# Patient Record
Sex: MALE | Race: BLACK OR AFRICAN AMERICAN | Employment: UNEMPLOYED | URBAN - METROPOLITAN AREA
[De-identification: names, ages, dates, MRNs, and addresses within clinical notes are randomized per-mention and may not be internally consistent; named-entity substitution may affect disease eponyms.]

---
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
## 2022-10-14 ENCOUNTER — ANESTHESIA EVENT (OUTPATIENT)
Dept: SURGERY | Age: 22
End: 2022-10-14
Payer: COMMERCIAL

## 2022-10-17 ENCOUNTER — ANESTHESIA (OUTPATIENT)
Dept: SURGERY | Age: 22
End: 2022-10-17
Payer: COMMERCIAL

## 2022-10-17 ENCOUNTER — HOSPITAL ENCOUNTER (OUTPATIENT)
Age: 22
Setting detail: OUTPATIENT SURGERY
Discharge: HOME | End: 2022-10-17
Attending: PODIATRIST | Admitting: PODIATRIST
Payer: COMMERCIAL

## 2022-10-17 VITALS
RESPIRATION RATE: 17 BRPM | HEART RATE: 75 BPM | WEIGHT: 210 LBS | OXYGEN SATURATION: 99 % | HEIGHT: 71 IN | BODY MASS INDEX: 29.4 KG/M2 | SYSTOLIC BLOOD PRESSURE: 135 MMHG | TEMPERATURE: 97.6 F | DIASTOLIC BLOOD PRESSURE: 80 MMHG

## 2022-10-17 DIAGNOSIS — B07.0 PLANTAR WART, LEFT FOOT: Primary | ICD-10-CM

## 2022-10-17 PROCEDURE — 76060000031 HC ANESTHESIA FIRST 0.5 HR: Performed by: PODIATRIST

## 2022-10-17 PROCEDURE — 76210000020 HC REC RM PH II FIRST 0.5 HR: Performed by: PODIATRIST

## 2022-10-17 PROCEDURE — 74011000250 HC RX REV CODE- 250: Performed by: PODIATRIST

## 2022-10-17 PROCEDURE — 76210000063 HC OR PH I REC FIRST 0.5 HR: Performed by: PODIATRIST

## 2022-10-17 PROCEDURE — 88304 TISSUE EXAM BY PATHOLOGIST: CPT

## 2022-10-17 PROCEDURE — 2709999900 HC NON-CHARGEABLE SUPPLY: Performed by: PODIATRIST

## 2022-10-17 PROCEDURE — 74011250636 HC RX REV CODE- 250/636: Performed by: STUDENT IN AN ORGANIZED HEALTH CARE EDUCATION/TRAINING PROGRAM

## 2022-10-17 PROCEDURE — 76010000154 HC OR TIME FIRST 0.5 HR: Performed by: PODIATRIST

## 2022-10-17 RX ORDER — MIDAZOLAM HYDROCHLORIDE 1 MG/ML
INJECTION, SOLUTION INTRAMUSCULAR; INTRAVENOUS AS NEEDED
Status: DISCONTINUED | OUTPATIENT
Start: 2022-10-17 | End: 2022-10-18 | Stop reason: HOSPADM

## 2022-10-17 RX ORDER — HYDROCODONE BITARTRATE AND ACETAMINOPHEN 5; 325 MG/1; MG/1
1 TABLET ORAL
Qty: 20 TABLET | Refills: 0 | Status: SHIPPED | OUTPATIENT
Start: 2022-10-17 | End: 2022-10-22

## 2022-10-17 RX ORDER — SODIUM CHLORIDE, SODIUM LACTATE, POTASSIUM CHLORIDE, CALCIUM CHLORIDE 600; 310; 30; 20 MG/100ML; MG/100ML; MG/100ML; MG/100ML
50 INJECTION, SOLUTION INTRAVENOUS CONTINUOUS
Status: DISCONTINUED | OUTPATIENT
Start: 2022-10-17 | End: 2022-10-17 | Stop reason: HOSPADM

## 2022-10-17 RX ORDER — SULFAMETHOXAZOLE AND TRIMETHOPRIM 800; 160 MG/1; MG/1
1 TABLET ORAL 2 TIMES DAILY
Qty: 20 TABLET | Refills: 0 | Status: SHIPPED | OUTPATIENT
Start: 2022-10-17

## 2022-10-17 RX ORDER — SODIUM CHLORIDE 0.9 % (FLUSH) 0.9 %
5-40 SYRINGE (ML) INJECTION EVERY 8 HOURS
Status: DISCONTINUED | OUTPATIENT
Start: 2022-10-17 | End: 2022-10-17 | Stop reason: HOSPADM

## 2022-10-17 RX ORDER — SODIUM CHLORIDE 0.9 % (FLUSH) 0.9 %
5-40 SYRINGE (ML) INJECTION AS NEEDED
Status: DISCONTINUED | OUTPATIENT
Start: 2022-10-17 | End: 2022-10-17 | Stop reason: HOSPADM

## 2022-10-17 RX ORDER — PROPOFOL 10 MG/ML
INJECTION, EMULSION INTRAVENOUS AS NEEDED
Status: DISCONTINUED | OUTPATIENT
Start: 2022-10-17 | End: 2022-10-18 | Stop reason: HOSPADM

## 2022-10-17 RX ORDER — SODIUM CHLORIDE 9 MG/ML
50 INJECTION, SOLUTION INTRAVENOUS CONTINUOUS
Status: DISCONTINUED | OUTPATIENT
Start: 2022-10-17 | End: 2022-10-17 | Stop reason: HOSPADM

## 2022-10-17 RX ORDER — BUPIVACAINE HYDROCHLORIDE AND EPINEPHRINE 2.5; 5 MG/ML; UG/ML
INJECTION, SOLUTION EPIDURAL; INFILTRATION; INTRACAUDAL; PERINEURAL AS NEEDED
Status: DISCONTINUED | OUTPATIENT
Start: 2022-10-17 | End: 2022-10-17 | Stop reason: HOSPADM

## 2022-10-17 RX ORDER — BACITRACIN 500 [USP'U]/G
OINTMENT TOPICAL AS NEEDED
Status: DISCONTINUED | OUTPATIENT
Start: 2022-10-17 | End: 2022-10-17 | Stop reason: HOSPADM

## 2022-10-17 RX ADMIN — PROPOFOL 125 MG: 10 INJECTION, EMULSION INTRAVENOUS at 12:25

## 2022-10-17 RX ADMIN — MIDAZOLAM HYDROCHLORIDE 2 MG: 2 INJECTION, SOLUTION INTRAMUSCULAR; INTRAVENOUS at 12:20

## 2022-10-17 NOTE — ANESTHESIA PREPROCEDURE EVALUATION
Relevant Problems   No relevant active problems       Anesthetic History   No history of anesthetic complications            Review of Systems / Medical History  Patient summary reviewed, nursing notes reviewed and pertinent labs reviewed    Pulmonary            Asthma : well controlled  Pertinent negatives: No recent URI     Neuro/Psych   Within defined limits           Cardiovascular  Within defined limits                Exercise tolerance: >4 METS     GI/Hepatic/Renal  Within defined limits              Endo/Other  Within defined limits           Other Findings              Physical Exam    Airway  Mallampati: II  TM Distance: 4 - 6 cm  Neck ROM: normal range of motion   Mouth opening: Normal     Cardiovascular  Regular rate and rhythm,  S1 and S2 normal,  no murmur, click, rub, or gallop             Dental    Dentition: Lower dentition intact and Upper dentition intact     Pulmonary  Breath sounds clear to auscultation               Abdominal         Other Findings            Anesthetic Plan    ASA: 2  Anesthesia type: MAC          Induction: Intravenous  Anesthetic plan and risks discussed with: Patient

## 2022-10-17 NOTE — BRIEF OP NOTE
Brief Postoperative Note    Patient: Helen Sandoval  YOB: 2000  MRN: [id number]    Date of Procedure: 10/17/2022     Pre-Op Diagnosis: WART LEFT FOOT    Post-Op Diagnosis: Same as preoperative diagnosis.       Procedure(s):  DESTRUCTION OF WART LEFT FOOT    Surgeon(s):  Richlele Francois DPM    Surgical Assistant: None    Anesthesia: MAC     Estimated Blood Loss (mL): Minimal    Complications: None    Specimens:   ID Type Source Tests Collected by Time Destination   1 : Wart Left Foot Preservative Foot, left  [facility] 10/17/2022 1233 Pathology        Implants: * No implants in log *    Drains: * No LDAs found *    Findings: wart  Electronically Signed by Claritza Gomez DPM on 10/17/2022 at 12:47 PM

## 2022-10-17 NOTE — DISCHARGE INSTRUCTIONS
DISCHARGE SUMMARY from Nurse    PATIENT INSTRUCTIONS:    After general anesthesia or intravenous sedation, for 24 hours or while taking prescription Narcotics:  Limit your activities  Do not drive and operate hazardous machinery  Do not make important personal or business decisions  Do  not drink alcoholic beverages  If you have not urinated within 8 hours after discharge, please contact your surgeon on call. Report the following to your surgeon:  Excessive pain, swelling, redness or odor of or around the surgical area  Temperature over 100.5  Nausea and vomiting lasting longer than 4 hours or if unable to take medications  Any signs of decreased circulation or nerve impairment to extremity: change in color, persistent  numbness, tingling, coldness or increase pain  Any questions      These are general instructions for a healthy lifestyle:    No smoking/ No tobacco products/ Avoid exposure to second hand smoke  Surgeon General's Warning:  Quitting smoking now greatly reduces serious risk to your health. Obesity, smoking, and sedentary lifestyle greatly increases your risk for illness    A healthy diet, regular physical exercise & weight monitoring are important for maintaining a healthy lifestyle    You may be retaining fluid if you have a history of heart failure or if you experience any of the following symptoms:  Weight gain of 3 pounds or more overnight or 5 pounds in a week, increased swelling in our hands or feet or shortness of breath while lying flat in bed. Please call your doctor as soon as you notice any of these symptoms; do not wait until your next office visit. The discharge information has been reviewed with the {PATIENT PARENT GUARDIAN:25027}. The {PATIENT PARENT GUARDIAN:36261} verbalized understanding.   Discharge medications reviewed with the {Dishcarge meds reviewed DMMI:63141} and appropriate educational materials and side effects teaching were provided.   ___________________________________________________________________________________________________________________________________

## 2022-10-17 NOTE — PERIOP NOTES
Spoke via telephone with patient's mother, Adriane Barry, who advised she will return to take responsibility for patient when discharged.

## 2022-10-17 NOTE — PERIOP NOTES
Handoff Report from Operating Room to PACU    Report received from Dr. Camille Vazquez and Jenny Magaña regarding Leighton Rosario. Surgeon(s):  Katie Hutchinson DPM  And Procedure(s) (LRB):  DESTRUCTION OF WART LEFT FOOT (Left)  confirmed   with allergies and dressings discussed. Anesthesia type, drugs, patient history, complications, estimated blood loss, vital signs, intake and output, and lines and temperature were reviewed.

## 2022-10-17 NOTE — OP NOTES
[facility]  OPERATIVE REPORT    Name:  Cole Mejia  MR#:  [id number]  :  2000  ACCOUNT #:  [de-identified]  DATE OF SERVICE:  10/17/2022    PREOPERATIVE DIAGNOSIS:  Wart, plantar aspect, left foot. POSTOPERATIVE DIAGNOSIS:  Wart, plantar aspect, left foot. PROCEDURE PERFORMED:  Destruction of wart, left foot. SURGEON:  Maritza Worthington DPM    ASSISTANT:none    ANESTHESIA:  IV sedation with local.    COMPLICATIONS:one    SPECIMENS REMOVED:  Soft tissue. IMPLANTS: none    ESTIMATED BLOOD LOSS:  Minimal.    INDICATIONS:  This 71-year-old black male presents with a painful lesion plantar left foot of more than 1 month. Conservative therapy has failed to alleviate the patient of symptoms and at this time, surgical intervention has been opted as the treatment of choice by the patient. Medical history and physical has been performed. The patient is released to surgery. Physical examination revealed palpable pedal pulses, fairly good muscle strength lower extremities bilateral.  Epicritic sensation intact. Plantar medial aspect distally, there is a circular painful wart-like lesion that is painful upon lateral compression and direct pressure. DESCRIPTION OF PROCEDURE:  The patient was brought into the operating room and placed on the operating table in the supine position. Under the influence of IV sedation, anesthesia was achieved using 0.05% Marcaine with epinephrine. Left foot was now prepped and draped in the usual sterile manner and a successful time-out was completed. Next, using a #15 blade and a curette, the wart was excised through the subcutaneous layer. The remaining wound was cauterized using electrocautery. Bacitracin followed by dry sterile dressings were placed on the left foot.   The patient appeared to have tolerated anesthesia and procedure well and was transferred from the operating room to Recovery with all vital signs stable and vascular status intact to all digits one through five, left foot. Preoperatively, he was given 1 g Ancef IV.       Desirae Michelle DPM      [id number]  D:  10/17/2022 13:59  T:  10/17/2022 16:47  JOB #:  [id number]

## 2022-10-17 NOTE — H&P
History and Physical    Subjective: John Gayle is a 25 y.o.  male who presents with a painful wart left foot. Onset of symptoms was gradual with gradually worsening course since that time. The pain is located plantar left foot. Patient describes the pain as continuous and rated as moderate. Pain has been associated with standing. Past Medical History:   Diagnosis Date    Asthma     in childhood      History reviewed. No pertinent surgical history. History reviewed. No pertinent family history. Social History     Tobacco Use    Smoking status: Every Day     Types: Cigars    Smokeless tobacco: Never    Tobacco comments:     Black and mild, one per day   Substance Use Topics    Alcohol use: Not Currently       Prior to Admission medications    Not on File     No Known Allergies     Review of Systems:  A comprehensive review of systems was negative except for that written in the History of Present Illness. Objective: Intake and Output:    No intake/output data recorded. No intake/output data recorded. Physical Exam:   Visit Vitals  /78 (BP 1 Location: Left upper arm, BP Patient Position: Reclining)   Pulse 70   Temp 97.7 °F (36.5 °C)   Resp 15   Ht 5' 11\" (1.803 m)   Wt 95.3 kg (210 lb)   SpO2 97%   BMI 29.29 kg/m²     General:  Alert, cooperative, no distress, appears stated age. Head:  Normocephalic, without obvious abnormality, atraumatic. Eyes:  Conjunctivae/corneas clear. PERRL   Ears:  Normal external ear canals both ears. Nose: Nares normal. Septum midline. Mucosa normal. No drainage or sinus tenderness. Throat: Lips, mucosa, and tongue normal. Teeth and gums normal.   Neck: Supple, symmetrical, trachea midline, no adenopathy, thyroid: no enlargement/tenderness/nodules,    Back:   Symmetric, no curvature. ROM normal.    Lungs:   Clear to auscultation bilaterally. Chest wall:  No tenderness or deformity.    Heart:  Regular rate and rhythm, S1, S2 normal, no murmur, click, rub or gallop. Abdomen:   Soft, non-tender. Bowel sounds normal. No masses,  No organomegaly. Genitalia:  Normal male without lesion, discharge or tenderness. Rectal:  Normal tone, normal prostate, no masses or tenderness  Guaiac negative stool. Extremities: Extremities normal, atraumatic, no cyanosis or edema. Pulses: 2+ and symmetric all extremities. Skin: Skin color, texture, turgor normal. No rashes or lesions   Lymph nodes: Cervical, supraclavicular, and axillary nodes normal.   Neurologic: CNII-XII intact. Normal strength, sensation and reflexes throughout. Lower extremities; Palpable pedal pulses with epicritic sensation intact   Good muscle strength   Painful circulare wart plantar medial left foot      Data Review:   No results found for this or any previous visit (from the past 24 hour(s)). Assessment:     Principal Problem:    Plantar wart, left foot (10/17/2022)        Plan:   Scheduled for destruction of wart left foot. Discussed risks, benefits, expected outcome as well as post op course.

## 2022-10-17 NOTE — PERIOP NOTES
Patient meets discharge criteria. Patient and mother [place] provided with verbal and written discharge instructions. Patient discharged by wheelchair with mother [place] to transport home.

## 2022-10-18 NOTE — ANESTHESIA POSTPROCEDURE EVALUATION
Procedure(s):  DESTRUCTION OF WART LEFT FOOT. MAC    Anesthesia Post Evaluation      Multimodal analgesia: multimodal analgesia used between 6 hours prior to anesthesia start to PACU discharge  Patient location during evaluation: PACU  Patient participation: complete - patient participated  Level of consciousness: awake and alert  Pain management: adequate  Airway patency: patent  Anesthetic complications: no  Cardiovascular status: acceptable  Respiratory status: acceptable  Hydration status: acceptable  Post anesthesia nausea and vomiting:  none  Final Post Anesthesia Temperature Assessment:  Normothermia (36.0-37.5 degrees C)      INITIAL Post-op Vital signs:   Vitals Value Taken Time   /78 10/17/22 1305   Temp 36.6 °C (97.8 °F) 10/17/22 1247   Pulse 81 10/17/22 1305   Resp 23 10/17/22 1305   SpO2 99 % 10/17/22 1305   Vitals shown include unvalidated device data.

## 2022-11-19 ENCOUNTER — HOSPITAL ENCOUNTER (EMERGENCY)
Age: 22
Discharge: HOME | End: 2022-11-19
Attending: EMERGENCY MEDICINE
Payer: COMMERCIAL

## 2022-11-19 ENCOUNTER — APPOINTMENT (OUTPATIENT)
Dept: CT IMAGING | Age: 22
End: 2022-11-19
Attending: EMERGENCY MEDICINE
Payer: COMMERCIAL

## 2022-11-19 VITALS
HEART RATE: 77 BPM | DIASTOLIC BLOOD PRESSURE: 77 MMHG | OXYGEN SATURATION: 100 % | SYSTOLIC BLOOD PRESSURE: 140 MMHG | WEIGHT: 250 LBS | BODY MASS INDEX: 33.86 KG/M2 | HEIGHT: 72 IN | RESPIRATION RATE: 18 BRPM | TEMPERATURE: 98 F

## 2022-11-19 DIAGNOSIS — S09.90XA CLOSED HEAD INJURY, INITIAL ENCOUNTER: Primary | ICD-10-CM

## 2022-11-19 DIAGNOSIS — S01.81XA CHIN LACERATION, INITIAL ENCOUNTER: ICD-10-CM

## 2022-11-19 DIAGNOSIS — F12.10 CANNABIS ABUSE: ICD-10-CM
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
## 2022-11-19 PROCEDURE — 70486 CT MAXILLOFACIAL W/O DYE: CPT

## 2022-11-19 PROCEDURE — 74011000250 HC RX REV CODE- 250: Performed by: EMERGENCY MEDICINE

## 2022-11-19 PROCEDURE — 70450 CT HEAD/BRAIN W/O DYE: CPT

## 2022-11-19 PROCEDURE — 74011250636 HC RX REV CODE- 250/636: Performed by: EMERGENCY MEDICINE

## 2022-11-19 PROCEDURE — 90715 TDAP VACCINE 7 YRS/> IM: CPT | Performed by: EMERGENCY MEDICINE

## 2022-11-19 PROCEDURE — 74011250637 HC RX REV CODE- 250/637: Performed by: EMERGENCY MEDICINE

## 2022-11-19 PROCEDURE — 90471 IMMUNIZATION ADMIN: CPT

## 2022-11-19 PROCEDURE — 99284 EMERGENCY DEPT VISIT MOD MDM: CPT

## 2022-11-19 PROCEDURE — 75810000293 HC SIMP/SUPERF WND  RPR

## 2022-11-19 RX ORDER — BACITRACIN 500 [USP'U]/G
OINTMENT TOPICAL 3 TIMES DAILY
Qty: 28 G | Refills: 0 | Status: SHIPPED | OUTPATIENT
Start: 2022-11-19 | End: 2022-11-30

## 2022-11-19 RX ORDER — LIDOCAINE HYDROCHLORIDE AND EPINEPHRINE 10; 10 MG/ML; UG/ML
1.5 INJECTION, SOLUTION INFILTRATION; PERINEURAL
Status: COMPLETED | OUTPATIENT
Start: 2022-11-19 | End: 2022-11-19

## 2022-11-19 RX ORDER — ACETAMINOPHEN 500 MG
1000 TABLET ORAL
Status: COMPLETED | OUTPATIENT
Start: 2022-11-19 | End: 2022-11-19

## 2022-11-19 RX ADMIN — Medication 15 MG: at 01:42

## 2022-11-19 RX ADMIN — ACETAMINOPHEN 1000 MG: 500 TABLET ORAL at 02:35

## 2022-11-19 RX ADMIN — TETANUS TOXOID, REDUCED DIPHTHERIA TOXOID AND ACELLULAR PERTUSSIS VACCINE, ADSORBED 0.5 ML: 5; 2.5; 8; 8; 2.5 SUSPENSION INTRAMUSCULAR at 02:35

## 2022-11-19 NOTE — ED NOTES
Emergency Department Nursing Plan of Care       The Nursing Plan of Care is developed from the Nursing assessment and Emergency Department Attending provider initial evaluation. The plan of care may be reviewed in the ED Provider note.     The Plan of Care was developed with the following considerations:   Patient / Family readiness to learn indicated by:verbalized understanding  Persons(s) to be included in education: patient  Barriers to Learning/Limitations:No    Signed     Alejandro Corral RN    11/19/2022   2:20 AM

## 2022-11-19 NOTE — ED PROVIDER NOTES
EMERGENCY DEPARTMENT HISTORY AND PHYSICAL EXAM               Please note that this dictation was completed with the assistance of \"Dragon\", the computer voice recognition software. Quite often unanticipated grammatical, syntax, homophones, and other interpretive errors are inadvertently transcribed by the computer software. Please disregard these errors and any errors that have escaped final proofreading. Thank you. Date: 11/19/22  Patient: Leighton Rosario  Patient Age and Sex: 25 y.o. male   MRN: [id number]  CSN: [id number]    History of Presenting Illness     Chief Complaint   Patient presents with    Head Injury    Laceration     chin     History Provided By: Patient/family/EMS (if applicable)    HPI: Leighton Rosario, 25 y.o. male with past medical history as documented below presents to the ED with c/o of fall with subsequent chin laceration. Patient reports that he feels dehydrated and has not been eating much throughout the day. He did smoke some marijuana tonight. Patient states that he felt dizzy and fell onto his chin. Denies any LOC or head injury. Patient reports unknown last tetanus. Denies any epistaxis, denies any visual complaints. He denies any preceding chest pain, shortness of breath or palpitations. Pt denies any other exacerbating or ameliorating factors. Pt specifically denies any recent fever, chills, nausea, vomiting, abdominal pain, CP, SOB, lightheadedness, dizziness, numbness, weakness, lower extremity swelling, heart palpitations, urinary sxs, diarrhea, constipation, melena, hematochezia, cough, or congestion. There are no other complaints, changes or physical findings pertinent to the HPI at this time.     PCP: None  Past History   Past Medical History:  Past Medical History:   Diagnosis Date    Asthma     in childhood       Past Surgical History:  Denies    Family History:   Family history reviewed and was non-contributory, unless specified below:    Social History:  Social History     Tobacco Use    Smoking status: Every Day     Types: Cigars    Smokeless tobacco: Never    Tobacco comments:     Black and mild, one per day   Vaping Use    Vaping Use: Never used   Substance Use Topics    Alcohol use: Not Currently    Drug use: Yes     Types: Marijuana       Allergies:  No Known Allergies    Current Medications:  No current facility-administered medications on file prior to encounter. Current Outpatient Medications on File Prior to Encounter   Medication Sig Dispense Refill    trimethoprim-sulfamethoxazole (BACTRIM DS, SEPTRA DS) 160-800 mg per tablet Take 1 Tablet by mouth two (2) times a day. 20 Tablet 0     Review of Systems   A complete ROS was reviewed by me today and all other systems negative, unless otherwise specified below:  Review of Systems   Constitutional: Negative. Negative for chills and fever. HENT: Negative. Negative for congestion and sore throat. Eyes: Negative. Respiratory: Negative. Negative for cough, chest tightness, shortness of breath and wheezing. Cardiovascular: Negative. Negative for chest pain, palpitations and leg swelling. Gastrointestinal: Negative. Negative for abdominal distention, abdominal pain, blood in stool, constipation, diarrhea, nausea and vomiting. Endocrine: Negative. Genitourinary: Negative. Negative for difficulty urinating, dysuria, flank pain, frequency, hematuria and urgency. Musculoskeletal: Negative. Negative for back pain and neck stiffness. Skin:  Positive for wound. Negative for rash. Allergic/Immunologic: Negative. Neurological:  Positive for headaches. Negative for dizziness, syncope, weakness, light-headedness and numbness. Hematological: Negative. Psychiatric/Behavioral: Negative. Negative for confusion and self-injury. Physical Exam   Physical Exam  Vitals and nursing note reviewed. Constitutional:       Appearance: He is well-developed. He is not toxic-appearing.    HENT: Head: Normocephalic. Comments: Approximately 3 cm linear laceration noted on the chin, no exposed bone or muscle, no foreign body, bleeding controlled. No septal hematoma, no hemotympanum, midface is stable. No C-spine tenderness palpation. Mouth/Throat:      Pharynx: No posterior oropharyngeal erythema. Eyes:      Conjunctiva/sclera: Conjunctivae normal.      Pupils: Pupils are equal, round, and reactive to light. Cardiovascular:      Rate and Rhythm: Normal rate and regular rhythm. Heart sounds: Normal heart sounds. No murmur heard. No friction rub. No gallop. Pulmonary:      Effort: Pulmonary effort is normal. No respiratory distress. Breath sounds: Normal breath sounds. No wheezing or rales. Chest:      Chest wall: No tenderness. Abdominal:      General: Bowel sounds are normal. There is no distension. Palpations: Abdomen is soft. There is no mass. Tenderness: There is no abdominal tenderness. There is no guarding or rebound. Musculoskeletal:         General: No tenderness or deformity. Normal range of motion. Cervical back: Normal range of motion. Skin:     General: Skin is warm. Findings: No rash. Neurological:      Mental Status: He is alert and oriented to person, place, and time. Cranial Nerves: No cranial nerve deficit. Motor: No abnormal muscle tone. Coordination: Coordination normal.      Deep Tendon Reflexes: Reflexes normal.   Psychiatric:         Behavior: Behavior is cooperative. Diagnostic Study Results     Laboratory Data  I have personally reviewed and interpreted all available laboratory results. No results found for this or any previous visit (from the past 24 hour(s)). Radiologic Studies   I have personally reviewed and interpreted all available imaging studies and agree with radiology interpretation. CT MAXILLOFACIAL WO CONT   Final Result   No acute abnormality.        CT HEAD WO CONT   Final Result No acute intracranial abnormality. CT Results  (Last 48 hours)                 11/19/22 0221  CT MAXILLOFACIAL WO CONT Final result    Impression:  No acute abnormality. Narrative:  EXAM:  CT MAXILLOFACIAL WO CONT       INDICATION:  Fall with face injury. Chin laceration. COMPARISON: None       TECHNIQUE: Helical CT of the facial bones with coronal and sagittal reformats. Images reviewed in soft tissue and bone windows. CT dose reduction was achieved   through use of a standardized protocol tailored for this examination and   automatic exposure control for dose modulation. CONTRAST: None. FINDINGS:    There is no acute fracture. The nasal septum is midline. The temporomandibular   joints are intact. The facial soft tissues are unremarkable. The globes and orbits are symmetric   and within normal limits. There is trace mucosal thickening in the right ethmoid air cells and bilateral   maxillary, and right sphenoid sinuses. The remaining paranasal sinuses and   mastoid air cells are clear. Limited intracranial images are unremarkable. 11/19/22 0221  CT HEAD WO CONT Final result    Impression:      No acute intracranial abnormality. Narrative:  EXAM:  CT HEAD WO CONT       INDICATION: Fall with head injury. COMPARISON: None       TECHNIQUE: Noncontrast head CT. Coronal and sagittal reformats. CT dose   reduction was achieved through use of a standardized protocol tailored for this   examination and automatic exposure control for dose modulation. FINDINGS: The ventricles and sulci are age-appropriate without hydrocephalus. There is no mass effect or midline shift. There is no intracranial hemorrhage or   extra-axial fluid collection. There is no abnormal parenchymal attenuation. The   gray-white matter differentiation is maintained. The basal cisterns are patent. The osseous structures are intact.  The visualized paranasal sinuses and mastoid   air cells are clear. CXR Results  (Last 48 hours)      None          Medical Decision Making   I am the first and primary ED physician for this patient's ED visit today. I reviewed our EMR for any past records that may contribute to the patient's current condition, including their past medical, surgical, social and family history. This also includes their most recent ED visits, previous hospitalizations and prior diagnostic data. I have reviewed and summarized the most pertinent findings in my HPI and MDM. Vital Signs Reviewed:  Patient Vitals for the past 24 hrs:   Temp Pulse Resp BP SpO2   11/19/22 0125 98 °F (36.7 °C) 77 18 (!) 140/77 100 %     Pulse Oximetry Analysis: 100% on RA    Cardiac Monitor:   Rate: 77 bpm  The cardiac monitor revealed the following rhythm as interpreted by me: Normal Sinus Rhythm  Cardiac monitoring was ordered to monitor patient for signs of cardiac dysrhythmia, which they are at risk for based on their history and/or risk for cardiovascular disease and/or metabolic abnormalities. Records Reviewed: Nursing Notes, Old Medical Records, Previous electrocardiograms, Previous Radiology Studies and Previous Laboratory Studies, EMS reports    Provider Notes (Medical Decision Making):   Patient presents after fall with headache and chin pain. Will get imaging to further evaluate for fracture vs. Dislocation vs. Contusion. Will treat with analgesics at this time and continue to monitor for changes in mentation. I have discussed with the patient how to reduce likelihood of falling at home by removing throw rugs, loose wires or other objects from floor, installing hand-rails in bathrooms, tubs and halls, and leaving some lights on at night. Pt presents with resultant laceration of his chin requiring bedside repair. TDAP updated. NVI distally. Relatively clean wound, irrigated copiously and repaired at the bedside. See procedure note below. No antibiotics indicated at this time. NVI per routine post repair. No overt e/o compartment syndrome. Discussed strict return precautions, follow up for suture removal and wound care. Pt understands and agrees with above plan. ED Course:   Initial assessment performed. I discussed presenting problems and concerns, and my formulated plan for today's visit with the patient and any available family members. I have encouraged them to ask questions as they arise throughout the visit. Social History     Tobacco Use    Smoking status: Every Day     Types: Cigars    Smokeless tobacco: Never    Tobacco comments:     Black and mild, one per day   Vaping Use    Vaping Use: Never used   Substance Use Topics    Alcohol use: Not Currently    Drug use: Yes     Types: Marijuana     TOBACCO COUNSELING:  Upon evaluation, pt expressed that they are a current tobacco user. For approximately 3-5 mins, pt has been counseled on the dangers of smoking and was encouraged to quit as soon as possible in order to decrease further risks to their health. Pt has conveyed their understanding of the risks involved should they continue to use tobacco products.     ED Orders Placed:  Orders Placed This Encounter    CT HEAD WO CONT    CT MAXILLOFACIAL WO CONT    SUTURE TRAY TO ROOM    POC GLUCOSE    APPLY ICE TO SPECIFIED AREA    diph,Pertuss(AC),Tet Vac-PF (BOOSTRIX) suspension 0.5 mL    lidocaine-EPINEPHrine (XYLOCAINE) 1 %-1:100,000 injection 15 mg    acetaminophen (TYLENOL) tablet 1,000 mg    bacitracin (BACITRACIN) 500 unit/gram oint       ED Medications Administered During ED Course:  Medications   lidocaine-EPINEPHrine (XYLOCAINE) 1 %-1:100,000 injection 15 mg (has no administration in time range)   diph,Pertuss(AC),Tet Vac-PF (BOOSTRIX) suspension 0.5 mL (0.5 mL IntraMUSCular Given 11/19/22 0235)   acetaminophen (TYLENOL) tablet 1,000 mg (1,000 mg Oral Given 11/19/22 0235)        Amount and/or Complexity of Data Reviewed  Clinical lab tests: ordered as appropriate & reviewed  Tests in the radiology section of CPT®: ordered as appropriate & reviewed  Tests in the medicine section of CPT®: ordered as appropriate & reviewed  Obtain history from someone other than the patient: yes  Review and summarize past medical records: yes  Independent visualization of images, tracings, or specimens: yes     Progress Note:  I have just re-evaluated the patient. Patient reports improvement of symptoms. I have reviewed his vital signs and determined there is currently no worsening in their condition or physical exam. Results have been reviewed with them and their questions have been answered. I will continue to review further results as they come available. Procedure Note - Laceration Repair:  2:31 AM  Procedure by Rosie Cash MD  Complexity: simple   3 cm linear laceration to chin was irrigated copiously with NS under jet lavage, prepped with Betadine and draped in a sterile fashion. The area was anesthetized via local infiltration of 3 mL lidocaine 1% with epinephrine. The wound was explored with the following results: No foreign bodies found, No tendon laceration seen. The wound was repaired with One layer suture closure: Skin Layer:  3 sutures placed, stitch type:simple interrupted, suture: 5-0 nylon. .  The wound was closed with good hemostasis and approximation. Sterile dressing applied. Estimated blood loss:  scant  The procedure took 16-30 minutes, and pt tolerated well. Patient Reassessment:  Pt reassessed and symptoms noted to have improved significantly after ED treatment. Adarsh Goins's labs and imaging have been reviewed with him and available family. He verbally conveys understanding and agreement of the signs, symptoms, diagnosis, treatment and prognosis and additionally agrees to follow up as recommended with Dr. None and/or specialist as instructed.  He agrees with the care plan we have created and conveys that all of his questions have been answered. Additionally, I have put together a packet of discharge instructions for him that include: 1) Educational information regarding their diagnosis, 2) How to care for their diagnosis at home, as well a 3) List of reasons why they would want to return to the ED prior to their follow-up appointment should their condition change or symptoms worsen. Disposition:  DISCHARGE  The pt is ready for discharge. The pt's signs, symptoms, diagnosis, and discharge instructions have been discussed and pt has conveyed their understanding. The pt is to follow up as recommended or return to ER should their symptoms worsen. Plan has been discussed and pt is in agreement. I have answered all questions to the patient's satisfaction. Strict return precautions given. He and/or family conveyed understanding and agreement with care plan. Vital signs stable for discharge. Plan:  1. Return precautions as discussed with patient and available family/caregiver. 2.   Current Discharge Medication List        START taking these medications    Details   bacitracin (BACITRACIN) 500 unit/gram oint Apply  to affected area three (3) times daily for 10 days. Apply to affected area  Qty: 28 g, Refills: 0  Start date: 11/19/2022, End date: 11/29/2022           3. Follow-up Information       Follow up With Specialties Details Why [address] [facility] EM[facility]GENCY DEPT Emergency Medicine  As needed, If symptoms worsen [address]          Instructed to return to ED if worse  Diagnosis   Clinical Impression:  1. Closed head injury, initial encounter    2. Chin laceration, initial encounter    3. Cannabis abuse    4. Fall from ground level      Attestation:  Obie Penaloza MD, am the attending of record for this patient. I personally performed the services described in this documentation on this date, 11/19/2022 for patient, John Haas.  I have reviewed the chart and verified that the record is accurate and complete.

## 2022-11-19 NOTE — ED TRIAGE NOTES
Pt reports smoking marijuana tonight and found himself awake on the bathroom floor. Pt notes laceration below chin (not actively bleeding). Denies headache at this time. Alert and oriented x4.

## 2022-11-21 ENCOUNTER — APPOINTMENT (OUTPATIENT)
Dept: ULTRASOUND IMAGING | Age: 22
End: 2022-11-21
Attending: PHYSICIAN ASSISTANT
Payer: COMMERCIAL

## 2022-11-21 ENCOUNTER — HOSPITAL ENCOUNTER (EMERGENCY)
Age: 22
Discharge: HOME | End: 2022-11-21
Attending: EMERGENCY MEDICINE
Payer: COMMERCIAL

## 2022-11-21 VITALS
DIASTOLIC BLOOD PRESSURE: 82 MMHG | TEMPERATURE: 98.5 F | OXYGEN SATURATION: 98 % | WEIGHT: 250 LBS | RESPIRATION RATE: 16 BRPM | SYSTOLIC BLOOD PRESSURE: 136 MMHG | HEART RATE: 98 BPM | HEIGHT: 72 IN | BODY MASS INDEX: 33.86 KG/M2

## 2022-11-21 DIAGNOSIS — N45.3 EPIDIDYMO-ORCHITIS: Primary | ICD-10-CM

## 2022-11-21 LAB
ALBUMIN SERPL-MCNC: 4 G/DL (ref 3.5–5)
ALBUMIN/GLOB SERPL: 0.9 {RATIO} (ref 1.1–2.2)
ALP SERPL-CCNC: 66 U/L (ref 45–117)
ALT SERPL-CCNC: 50 U/L (ref 12–78)
ANION GAP SERPL CALC-SCNC: 10 MMOL/L (ref 5–15)
APPEARANCE UR: CLEAR
AST SERPL-CCNC: 17 U/L (ref 15–37)
BACTERIA URNS QL MICRO: NEGATIVE /HPF
BASOPHILS # BLD: 0 K/UL (ref 0–0.1)
BASOPHILS NFR BLD: 0 % (ref 0–1)
BILIRUB SERPL-MCNC: 1.1 MG/DL (ref 0.2–1)
BILIRUB UR QL: NEGATIVE
BUN SERPL-MCNC: 6 MG/DL (ref 6–20)
BUN/CREAT SERPL: 6 (ref 12–20)
C TRACH DNA SPEC QL NAA+PROBE: NEGATIVE
CALCIUM SERPL-MCNC: 9.2 MG/DL (ref 8.5–10.1)
CHLORIDE SERPL-SCNC: 98 MMOL/L (ref 97–108)
CO2 SERPL-SCNC: 29 MMOL/L (ref 21–32)
COLOR UR: ABNORMAL
CREAT SERPL-MCNC: 0.99 MG/DL (ref 0.7–1.3)
DIFFERENTIAL METHOD BLD: ABNORMAL
EOSINOPHIL # BLD: 0.1 K/UL (ref 0–0.4)
EOSINOPHIL NFR BLD: 1 % (ref 0–7)
EPITH CASTS URNS QL MICRO: ABNORMAL /LPF
ERYTHROCYTE [DISTWIDTH] IN BLOOD BY AUTOMATED COUNT: 12.3 % (ref 11.5–14.5)
GLOBULIN SER CALC-MCNC: 4.5 G/DL (ref 2–4)
GLUCOSE SERPL-MCNC: 250 MG/DL (ref 65–100)
GLUCOSE UR STRIP.AUTO-MCNC: >1000 MG/DL
HCT VFR BLD AUTO: 46.4 % (ref 36.6–50.3)
HGB BLD-MCNC: 15.6 G/DL (ref 12.1–17)
HGB UR QL STRIP: NEGATIVE
IMM GRANULOCYTES # BLD AUTO: 0.1 K/UL (ref 0–0.04)
IMM GRANULOCYTES NFR BLD AUTO: 1 % (ref 0–0.5)
KETONES UR QL STRIP.AUTO: 15 MG/DL
LEUKOCYTE ESTERASE UR QL STRIP.AUTO: ABNORMAL
LYMPHOCYTES # BLD: 3.6 K/UL (ref 0.8–3.5)
LYMPHOCYTES NFR BLD: 23 % (ref 12–49)
MCH RBC QN AUTO: 26.7 PG (ref 26–34)
MCHC RBC AUTO-ENTMCNC: 33.6 G/DL (ref 30–36.5)
MCV RBC AUTO: 79.5 FL (ref 80–99)
MONOCYTES # BLD: 1.1 K/UL (ref 0–1)
MONOCYTES NFR BLD: 7 % (ref 5–13)
N GONORRHOEA DNA SPEC QL NAA+PROBE: NEGATIVE
NEUTS SEG # BLD: 10.6 K/UL (ref 1.8–8)
NEUTS SEG NFR BLD: 68 % (ref 32–75)
NITRITE UR QL STRIP.AUTO: NEGATIVE
NRBC # BLD: 0 K/UL (ref 0–0.01)
NRBC BLD-RTO: 0 PER 100 WBC
PH UR STRIP: 7 [PH] (ref 5–8)
PLATELET # BLD AUTO: 347 K/UL (ref 150–400)
PMV BLD AUTO: 10.1 FL (ref 8.9–12.9)
POTASSIUM SERPL-SCNC: 3.6 MMOL/L (ref 3.5–5.1)
PROT SERPL-MCNC: 8.5 G/DL (ref 6.4–8.2)
PROT UR STRIP-MCNC: 30 MG/DL
RBC # BLD AUTO: 5.84 M/UL (ref 4.1–5.7)
RBC #/AREA URNS HPF: ABNORMAL /HPF (ref 0–5)
SAMPLE TYPE: NORMAL
SERVICE CMNT-IMP: NORMAL
SODIUM SERPL-SCNC: 137 MMOL/L (ref 136–145)
SP GR UR REFRACTOMETRY: 1.03 (ref 1–1.03)
SPECIMEN SOURCE: NORMAL
UA: UC IF INDICATED,UAUC: ABNORMAL
UROBILINOGEN UR QL STRIP.AUTO: 1 EU/DL (ref 0.2–1)
WBC # BLD AUTO: 15.5 K/UL (ref 4.1–11.1)
WBC URNS QL MICRO: ABNORMAL /HPF (ref 0–4)

## 2022-11-21 PROCEDURE — 86735 MUMPS ANTIBODY: CPT

## 2022-11-21 PROCEDURE — 99284 EMERGENCY DEPT VISIT MOD MDM: CPT

## 2022-11-21 PROCEDURE — 96374 THER/PROPH/DIAG INJ IV PUSH: CPT

## 2022-11-21 PROCEDURE — 80053 COMPREHEN METABOLIC PANEL: CPT

## 2022-11-21 PROCEDURE — 74011000250 HC RX REV CODE- 250: Performed by: PHYSICIAN ASSISTANT

## 2022-11-21 PROCEDURE — 96372 THER/PROPH/DIAG INJ SC/IM: CPT

## 2022-11-21 PROCEDURE — 96375 TX/PRO/DX INJ NEW DRUG ADDON: CPT

## 2022-11-21 PROCEDURE — 85025 COMPLETE CBC W/AUTO DIFF WBC: CPT

## 2022-11-21 PROCEDURE — 81001 URINALYSIS AUTO W/SCOPE: CPT

## 2022-11-21 PROCEDURE — 87491 CHLMYD TRACH DNA AMP PROBE: CPT

## 2022-11-21 PROCEDURE — 36415 COLL VENOUS BLD VENIPUNCTURE: CPT

## 2022-11-21 PROCEDURE — 76870 US EXAM SCROTUM: CPT

## 2022-11-21 PROCEDURE — 74011250636 HC RX REV CODE- 250/636: Performed by: PHYSICIAN ASSISTANT

## 2022-11-21 RX ORDER — ONDANSETRON 2 MG/ML
4 INJECTION INTRAMUSCULAR; INTRAVENOUS ONCE
Status: COMPLETED | OUTPATIENT
Start: 2022-11-21 | End: 2022-11-21

## 2022-11-21 RX ORDER — IBUPROFEN 800 MG/1
800 TABLET ORAL
Qty: 20 TABLET | Refills: 0 | Status: SHIPPED | OUTPATIENT
Start: 2022-11-21 | End: 2022-11-28

## 2022-11-21 RX ORDER — KETOROLAC TROMETHAMINE 30 MG/ML
30 INJECTION, SOLUTION INTRAMUSCULAR; INTRAVENOUS ONCE
Status: COMPLETED | OUTPATIENT
Start: 2022-11-21 | End: 2022-11-21

## 2022-11-21 RX ORDER — DOXYCYCLINE HYCLATE 100 MG
100 TABLET ORAL 2 TIMES DAILY
Qty: 20 TABLET | Refills: 0 | Status: SHIPPED | OUTPATIENT
Start: 2022-11-21 | End: 2022-12-01

## 2022-11-21 RX ADMIN — LIDOCAINE HYDROCHLORIDE 500 MG: 10 INJECTION, SOLUTION EPIDURAL; INFILTRATION; INTRACAUDAL; PERINEURAL at 12:58

## 2022-11-21 RX ADMIN — KETOROLAC TROMETHAMINE 30 MG: 30 INJECTION, SOLUTION INTRAMUSCULAR; INTRAVENOUS at 12:22

## 2022-11-21 RX ADMIN — ONDANSETRON 4 MG: 2 INJECTION INTRAMUSCULAR; INTRAVENOUS at 12:22

## 2022-11-21 NOTE — ED PROVIDER NOTES
EMERGENCY DEPARTMENT HISTORY AND PHYSICAL EXAM          Date: 11/21/2022  Patient Name: Jameel Lyle    History of Presenting Illness     Chief Complaint   Patient presents with    Testicle Pain         History Provided By: Patient and Patient's Mother    Chief Complaint: L testicular pain       HPI: Jameel Lyle is a 25 y.o. male with a PMH of No significant past medical history who presents with left testicular pain x1 day. He reports last night he began having testicular pain and swelling that is progressively worsening. He reports pain that is worse with standing, mildly relieved with lying down. He reports some bilateral lower abdominal pain and nausea. He states he feels like he has to have a bowel movement but has not had one in the last day. He denies heavy lifting. He denies new sexual partner or known exposure to STD. he denies trauma, fever, chills, dysuria, hematuria, penile discharge, penile pain, rectal pain, rectal bleeding, melena, diarrhea, constipation, vomiting. He was seen on 11/19 due to fall and chin laceration. He had a head CT that showed no acute abnormality. He received stiches and Tdap vaccine. PCP: None    Current Outpatient Medications   Medication Sig Dispense Refill    doxycycline (VIBRA-TABS) 100 mg tablet Take 1 Tablet by mouth two (2) times a day for 10 days. 20 Tablet 0    ibuprofen (MOTRIN) 800 mg tablet Take 1 Tablet by mouth every eight (8) hours as needed for Pain for up to 7 days. 20 Tablet 0    bacitracin (BACITRACIN) 500 unit/gram oint Apply  to affected area three (3) times daily for 10 days. Apply to affected area 28 g 0    trimethoprim-sulfamethoxazole (BACTRIM DS, SEPTRA DS) 160-800 mg per tablet Take 1 Tablet by mouth two (2) times a day. 20 Tablet 0       Past History     Past Medical History:  Past Medical History:   Diagnosis Date    Asthma     in childhood       Past Surgical History:  No past surgical history on file.     Family History:  No family history on file. Social History:  Social History     Tobacco Use    Smoking status: Every Day     Types: Cigars    Smokeless tobacco: Never    Tobacco comments:     Black and mild, one per day   Vaping Use    Vaping Use: Never used   Substance Use Topics    Alcohol use: Not Currently    Drug use: Yes     Types: Marijuana       Allergies:  No Known Allergies      Review of Systems   Review of Systems   Constitutional:  Negative for chills, fatigue and fever. HENT:  Negative for congestion and sore throat. Respiratory:  Negative for shortness of breath. Cardiovascular:  Negative for chest pain. Gastrointestinal:  Positive for abdominal pain. Negative for abdominal distention, anal bleeding, blood in stool, constipation, diarrhea, nausea, rectal pain and vomiting. Genitourinary:  Positive for scrotal swelling and testicular pain. Negative for dysuria, flank pain, genital sores, hematuria, penile discharge, penile pain, penile swelling and urgency. Musculoskeletal:  Negative for arthralgias, back pain, gait problem and myalgias. Skin:  Negative for rash. Neurological:  Negative for syncope and headaches. Physical Exam     Vitals:    11/21/22 1107   BP: 136/82   Pulse: 98   Resp: 16   Temp: 98.5 °F (36.9 °C)   SpO2: 98%   Weight: 113.4 kg (250 lb)   Height: 6' (1.829 m)     Physical Exam  Exam conducted with a chaperone present. Constitutional:       General: He is not in acute distress. Appearance: Normal appearance. HENT:      Head: Normocephalic and atraumatic. Eyes:      Extraocular Movements: Extraocular movements intact. Cardiovascular:      Rate and Rhythm: Normal rate and regular rhythm. Abdominal:      General: Bowel sounds are normal.      Palpations: There is no mass. Tenderness: There is no abdominal tenderness. There is no guarding. Hernia: No hernia is present. There is no hernia in the left inguinal area or right inguinal area.    Genitourinary:     Penis: Normal and circumcised. No tenderness, discharge or swelling. Testes:         Right: Mass, tenderness or swelling not present. Left: Tenderness and swelling present. Mass not present. Comments: L testicular tenderness, swelling, erythema, and warmth. Symptom exacerbation with dependence. No hernia, mass, hydrocele, or varicocele noted. No penile discharge. Skin:     General: Skin is warm and dry. Neurological:      General: No focal deficit present. Mental Status: He is alert and oriented to person, place, and time. Psychiatric:         Mood and Affect: Mood normal.         Behavior: Behavior normal.             Medical Decision Making   I am the first provider for this patient. I reviewed the vital signs, available nursing notes, past medical history, past surgical history, family history and social history. Vital Signs-Reviewed the patient's vital signs. Records Reviewed: Nursing Notes and Old Medical Records    Provider Notes (Medical Decision Making):   Patient presents to ED with stable vitals and L testicular pain and swelling x 1 day. Ddx: testicular torsion, epididymitis, orchitis, inguinal hernia, UTI, testicular cancer. ED Course as of 11/21/22 1427   Mon Nov 21, 2022   1250 Discussed ultrasound results with patient and mother with diagnosis of epididymoorchitis and usual etiology of gonorrhea/chlamydia. Patient questioning any other causes for it as he has no specific concerns for STD. However we will treat him empirically with Rocephin and Doxy but will also add a mumps lab as possible other causes. Also discussed with patient and mother about elevated blood sugar of 250 and greater than thousand in the urine. Mom advised to follow-up with PCP for hemoglobin A1c and assessment of diabetes. [AH]      ED Course User Index  [AH] Sherrie Choudhury PA-C     CBC with elevated WBC 15.4. CMP with elevated glucose , UA negative.  Testicular US shows epididymoorchitis with usual etiology of GC/chlamydia. GC/Chlamydia pending. Patient treated empirically with Rocephin and Doxy. Pt seen and note written by Rustam ROGERS, in conjunction with this provider. Johanne Calixto PA-C      Procedures:  Procedures    Diagnostic Study Results     Labs -     Recent Results (from the past 12 hour(s))   URINALYSIS W/ REFLEX CULTURE    Collection Time: 11/21/22 11:23 AM    Specimen: Urine   Result Value Ref Range    Color DARK YELLOW      Appearance CLEAR CLEAR      Specific gravity 1.030 1.003 - 1.030      pH (UA) 7.0 5.0 - 8.0      Protein 30 (A) NEG mg/dL    Glucose >1,000 (A) NEG mg/dL    Ketone 15 (A) NEG mg/dL    Bilirubin Negative NEG      Blood Negative NEG      Urobilinogen 1.0 0.2 - 1.0 EU/dL    Nitrites Negative NEG      Leukocyte Esterase SMALL (A) NEG      WBC 5-10 0 - 4 /hpf    RBC 0-5 0 - 5 /hpf    Epithelial cells FEW FEW /lpf    Bacteria Negative NEG /hpf    UA:UC IF INDICATED CULTURE NOT INDICATED BY UA RESULT CNI     CBC WITH AUTOMATED DIFF    Collection Time: 11/21/22 11:23 AM   Result Value Ref Range    WBC 15.5 (H) 4.1 - 11.1 K/uL    RBC 5.84 (H) 4.10 - 5.70 M/uL    HGB 15.6 12.1 - 17.0 g/dL    HCT 46.4 36.6 - 50.3 %    MCV 79.5 (L) 80.0 - 99.0 FL    MCH 26.7 26.0 - 34.0 PG    MCHC 33.6 30.0 - 36.5 g/dL    RDW 12.3 11.5 - 14.5 %    PLATELET 094 297 - 278 K/uL    MPV 10.1 8.9 - 12.9 FL    NRBC 0.0 0  WBC    ABSOLUTE NRBC 0.00 0.00 - 0.01 K/uL    NEUTROPHILS 68 32 - 75 %    LYMPHOCYTES 23 12 - 49 %    MONOCYTES 7 5 - 13 %    EOSINOPHILS 1 0 - 7 %    BASOPHILS 0 0 - 1 %    IMMATURE GRANULOCYTES 1 (H) 0.0 - 0.5 %    ABS. NEUTROPHILS 10.6 (H) 1.8 - 8.0 K/UL    ABS. LYMPHOCYTES 3.6 (H) 0.8 - 3.5 K/UL    ABS. MONOCYTES 1.1 (H) 0.0 - 1.0 K/UL    ABS. EOSINOPHILS 0.1 0.0 - 0.4 K/UL    ABS. BASOPHILS 0.0 0.0 - 0.1 K/UL    ABS. IMM.  GRANS. 0.1 (H) 0.00 - 0.04 K/UL    DF AUTOMATED     METABOLIC PANEL, COMPREHENSIVE    Collection Time: 11/21/22 11:23 AM   Result Value Ref Range    Sodium 137 136 - 145 mmol/L    Potassium 3.6 3.5 - 5.1 mmol/L    Chloride 98 97 - 108 mmol/L    CO2 29 21 - 32 mmol/L    Anion gap 10 5 - 15 mmol/L    Glucose 250 (H) 65 - 100 mg/dL    BUN 6 6 - 20 MG/DL    Creatinine 0.99 0.70 - 1.30 MG/DL    BUN/Creatinine ratio 6 (L) 12 - 20      eGFR >60 >60 ml/min/1.73m2    Calcium 9.2 8.5 - 10.1 MG/DL    Bilirubin, total 1.1 (H) 0.2 - 1.0 MG/DL    ALT (SGPT) 50 12 - 78 U/L    AST (SGOT) 17 15 - 37 U/L    Alk. phosphatase 66 45 - 117 U/L    Protein, total 8.5 (H) 6.4 - 8.2 g/dL    Albumin 4.0 3.5 - 5.0 g/dL    Globulin 4.5 (H) 2.0 - 4.0 g/dL    A-G Ratio 0.9 (L) 1.1 - 2.2         Radiologic Studies -   US SCROTUM/TESTICLES   Final Result   Left epididymoorchitis. CT Results  (Last 48 hours)      None          CXR Results  (Last 48 hours)      None                Disposition:  Discharged     DISCHARGE NOTE:   12:50 PM      Care plan outlined and precautions discussed. Patient has no new complaints, changes, or physical findings. Results of cbc, cmp, UA were reviewed with the patient. All medications were reviewed with the patient; will d/c home with doxycycline and ibuprofen. Received rocephin shot here. All of pt's questions and concerns were addressed. Patient was instructed and agrees to follow up with PCP, as well as to return to the ED upon further deterioration. Patient is ready to go home.     Follow-up Information       Follow up With Specialties Details Why Contact Info    [facility]   As needed [address] [facility] [place], [address] [facility] [phone]    [facility] EMERGENCY DEPT Emergency Medicine  If symptoms worsen Zoila Murphy            Discharge Medication List as of 11/21/2022 12:52 PM        START taking these medications    Details   doxycycline (VIBRA-TABS) 100 mg tablet Take 1 Tablet by mouth two (2) times a day for 10 days. , Normal, Disp-20 Tablet, R-0      ibuprofen (MOTRIN) 800 mg tablet Take 1 Tablet by mouth every eight (8) hours as needed for Pain for up to 7 days. , Normal, Disp-20 Tablet, R-0           CONTINUE these medications which have NOT CHANGED    Details   bacitracin (BACITRACIN) 500 unit/gram oint Apply  to affected area three (3) times daily for 10 days. Apply to affected area, Normal, Disp-28 g, R-0      trimethoprim-sulfamethoxazole (BACTRIM DS, SEPTRA DS) 160-800 mg per tablet Take 1 Tablet by mouth two (2) times a day., Normal, Disp-20 Tablet, R-0               Please note that this dictation was completed with Dragon, computer voice recognition software. Quite often unanticipated grammatical, syntax, homophones, and other interpretive errors are inadvertently transcribed by the computer software. Please disregard these errors. Additionally, please excuse any errors that have escaped final proofreading. Diagnosis     Clinical Impression:   1.  Epididymo-orchitis

## 2022-11-21 NOTE — ED NOTES
Pt continues to be in [address]. Awaiting his return for medications.

## 2022-11-21 NOTE — ED TRIAGE NOTES
Pt arrives to the ED AAOX4 with a c/c of left side testicular pain associated with swelling onset last night. Pt additionally reports suprapubic pain, and being unable to void since last night. Pt is noted in stable condition.

## 2022-11-26 LAB — MUV IGM SER IA-ACNC: <0.8 AU (ref 0–0.79)

## 2022-11-28 ENCOUNTER — HOSPITAL ENCOUNTER (EMERGENCY)
Age: 22
Discharge: HOME | End: 2022-11-28
Attending: EMERGENCY MEDICINE
Payer: COMMERCIAL

## 2022-11-28 VITALS
SYSTOLIC BLOOD PRESSURE: 140 MMHG | WEIGHT: 250 LBS | OXYGEN SATURATION: 98 % | HEIGHT: 71 IN | HEART RATE: 89 BPM | DIASTOLIC BLOOD PRESSURE: 81 MMHG | RESPIRATION RATE: 16 BRPM | TEMPERATURE: 98.6 F | BODY MASS INDEX: 35 KG/M2

## 2022-11-28 DIAGNOSIS — Z48.02 VISIT FOR SUTURE REMOVAL: Primary | ICD-10-CM

## 2022-11-28 PROCEDURE — 75810000275 HC EMERGENCY DEPT VISIT NO LEVEL OF CARE

## 2022-11-28 NOTE — ED NOTES
Pt arrives to the ED AAOX4, requesting suture removal from his chin. Pt is noted in stable condition, now in ED room with side rail up, bed to lowest position, and call light within reach. Will continue to monitor and wait for ED provider evaluation. Emergency Department Nursing Plan of Care       The Nursing Plan of Care is developed from the Nursing assessment and Emergency Department Attending provider initial evaluation. The plan of care may be reviewed in the ED Provider note.     The Plan of Care was developed with the following considerations:   Patient / Family readiness to learn indicated by:verbalized understanding  Persons(s) to be included in education: patient  Barriers to Learning/Limitations:No    Signed     Reynaldo Ballesteros    11/28/2022   3:18 PM

## 2022-11-28 NOTE — DISCHARGE INSTRUCTIONS
It was a pleasure taking care of you at [facility] Emergency Department today. We know that when you come to [facility], you are entrusting us with your health, comfort, and safety. Our physicians and nurses honor that trust, and we truly appreciate the opportunity to care for you and your loved ones. We also value our feedback. If you receive a survey about your Emergency Department experience today, please fill it out. We care about our patients' feedback, and we listen to what you have to say. Thank you!

## 2022-11-28 NOTE — ED PROVIDER NOTES
EMERGENCY DEPARTMENT HISTORY AND PHYSICAL EXAM          Date: 11/28/2022  Patient Name: Jameel Lyle    History of Presenting Illness     Chief Complaint   Patient presents with    Suture Removal     Pt here for Suture removal under chin          History Provided By: Patient      HPI: Jameel Lyle is a 25 y.o. male with a PMH of No significant past medical history who presents with suture removal. Sustained laceration not the chin status post head injury 1 week ago. He received 3 sutures. Reports using antibiotic ointment as prescribed. No c/o drainage, swelling, redness or pain. States his tetanus is UTD. Denies alleviating or aggravating factors. PCP: None    Current Outpatient Medications   Medication Sig Dispense Refill    doxycycline (VIBRA-TABS) 100 mg tablet Take 1 Tablet by mouth two (2) times a day for 10 days. 20 Tablet 0    ibuprofen (MOTRIN) 800 mg tablet Take 1 Tablet by mouth every eight (8) hours as needed for Pain for up to 7 days. 20 Tablet 0    bacitracin (BACITRACIN) 500 unit/gram oint Apply  to affected area three (3) times daily for 10 days. Apply to affected area 28 g 0    trimethoprim-sulfamethoxazole (BACTRIM DS, SEPTRA DS) 160-800 mg per tablet Take 1 Tablet by mouth two (2) times a day. 20 Tablet 0       Past History     Past Medical History:  Past Medical History:   Diagnosis Date    Asthma     in childhood       Past Surgical History:  History reviewed. No pertinent surgical history. Family History:  History reviewed. No pertinent family history. Social History:  Social History     Tobacco Use    Smoking status: Every Day     Types: Cigars    Smokeless tobacco: Never    Tobacco comments:     Black and mild, one per day   Vaping Use    Vaping Use: Never used   Substance Use Topics    Alcohol use: Not Currently    Drug use: Yes     Types: Marijuana       Allergies:  No Known Allergies      Review of Systems   Review of Systems   Constitutional:  Negative for chills and fever. Respiratory:  Negative for cough. Cardiovascular:  Negative for chest pain. Musculoskeletal:  Negative for arthralgias. Skin:  Positive for wound. Negative for pallor. Neurological:  Negative for numbness. All other systems reviewed and are negative. Physical Exam     Vitals:    11/28/22 1336   BP: (!) 140/81   Pulse: 89   Resp: 16   Temp: 98.6 °F (37 °C)   SpO2: 98%   Weight: 113.4 kg (250 lb)   Height: 5' 11\" (1.803 m)     Physical Exam  Vitals and nursing note reviewed. Constitutional:       General: He is not in acute distress. Appearance: He is well-developed. He is not ill-appearing. Cardiovascular:      Rate and Rhythm: Normal rate and regular rhythm. Pulses: Normal pulses. Heart sounds: Normal heart sounds. Pulmonary:      Effort: Pulmonary effort is normal.      Breath sounds: Normal breath sounds. Skin:     Comments: 3 intact sutures underneath chin with scabbing noted    Neurological:      Mental Status: He is alert and oriented to person, place, and time. Medical Decision Making   I am the first provider for this patient. I reviewed the vital signs, available nursing notes, past medical history, past surgical history, family history and social history. Vital Signs-Reviewed the patient's vital signs. Records Reviewed: Nursing Notes and Old Medical Records    Provider Notes (Medical Decision Making):   24 yo M presenting with suture removal exhibiting intact sutures underneath the chin exhibiting no signs of infection but scabbing noted. Sutures removed successfully with no signs of dehiscence.      DDX: cellulitis, suture removal wound dehiscence             Procedures:  Suture/Staple Removal    Date/Time: 11/28/2022 3:50 PM  Performed by: Karly Herrera NP  Authorized by: Karly Herrera NP     Consent:     Consent obtained:  Verbal    Consent given by:  Patient    Risks discussed:  Bleeding, pain and wound separation  Universal protocol: Patient identity confirmed:  Verbally with patient  Location:     Location:  [address] [facility] [address] location:  [place]  Procedure details:     Wound appearance:  No signs of infection    Number of sutures removed:  3  Post-procedure details:     Post-removal:  No dressing applied    Procedure completion:  Tolerated    Diagnostic Study Results     Labs -   No results found for this or any previous visit (from the past 12 hour(s)). Radiologic Studies -   No orders to display     CT Results  (Last 48 hours)      None          CXR Results  (Last 48 hours)      None                Disposition:  Discharge     DISCHARGE NOTE:       Care plan outlined and precautions discussed. Patient has no new complaints, changes, or physical findings. All of pt's questions and concerns were addressed. Patient was instructed and agrees to follow up with PCP, as well as to return to the ED upon further deterioration. Patient is ready to go home. Follow-up Information       Follow up With Specialties Details Why [place] Sofia Gilbert, [address]  [phone]            Current Discharge Medication List            Please note that this dictation was completed with Dragon, computer voice recognition software. Quite often unanticipated grammatical, syntax, homophones, and other interpretive errors are inadvertently transcribed by the computer software. Please disregard these errors. Additionally, please excuse any errors that have escaped final proofreading. Diagnosis     Clinical Impression:   1.  Visit for suture removal

## 2022-11-30 ENCOUNTER — OFFICE VISIT (OUTPATIENT)
Dept: INTERNAL MEDICINE CLINIC | Age: 22
End: 2022-11-30
Payer: COMMERCIAL

## 2022-11-30 VITALS
DIASTOLIC BLOOD PRESSURE: 88 MMHG | RESPIRATION RATE: 16 BRPM | SYSTOLIC BLOOD PRESSURE: 134 MMHG | HEART RATE: 78 BPM | HEIGHT: 71 IN | WEIGHT: 250.3 LBS | BODY MASS INDEX: 35.04 KG/M2 | OXYGEN SATURATION: 96 % | TEMPERATURE: 98.1 F

## 2022-11-30 DIAGNOSIS — R73.9 HYPERGLYCEMIA: ICD-10-CM

## 2022-11-30 DIAGNOSIS — E11.9 TYPE 2 DIABETES MELLITUS WITHOUT COMPLICATION, WITHOUT LONG-TERM CURRENT USE OF INSULIN (HCC): ICD-10-CM

## 2022-11-30 DIAGNOSIS — Z76.89 ENCOUNTER TO ESTABLISH CARE: Primary | ICD-10-CM

## 2022-11-30 DIAGNOSIS — J45.20 MILD INTERMITTENT ASTHMA, UNSPECIFIED WHETHER COMPLICATED: ICD-10-CM

## 2022-11-30 PROBLEM — J45.909 ASTHMA: Status: ACTIVE | Noted: 2022-11-30

## 2022-11-30 PROBLEM — B07.0 VERRUCA PLANTARIS: Status: ACTIVE | Noted: 2022-10-17

## 2022-11-30 PROCEDURE — 99204 OFFICE O/P NEW MOD 45 MIN: CPT | Performed by: INTERNAL MEDICINE

## 2022-11-30 NOTE — PROGRESS NOTES
SPORTS MEDICINE AND PRIMARY CARE  Solange Acosta MD, [address] [address]  Phone:  [phone]  Fax: [phone]    Chief Complaint   Patient presents with    [facility]    ED Follow-up     [facility] ER 11/19/2022 for LOC with injury       SUBJECTIVE:    José Luis Jaramillo is a 25 y.o. male This is a new patient to [facility]. He was seen by Mirian Page DPM, on 10/17 and a plantar wart removed from his left foot with the surgical report as verruca plantaris on the left foot. He went to the emergency room on 10/19 by Guerda Cotton following a head injury, laceration to his chin from a fall that had sutures placed. CT scan of the head was unremarkable and CT of the maxillofacial area revealed no fracture, trace thickening of the right ethmoid air cells and bilateral maxillary and sphenoid sinuses. Remainder of the sinuses was clear. Seen by Nat Orellana PA-C, on 11/21 with testicular pain and testicular ultrasound revealed epididymo-orchitis, with GC and Chlamydia pending. He was treated empirically with Rocephin and doxycycline. The serology and cultures were not available. He subsequently was treated in the emergency room on November 28 and had sutures removed. The patient comes in for evaluation. 22 His mom is requesting for checkup. Current Outpatient Medications   Medication Sig Dispense Refill    doxycycline (VIBRA-TABS) 100 mg tablet Take 1 Tablet by mouth two (2) times a day for 10 days. 20 Tablet 0    trimethoprim-sulfamethoxazole (BACTRIM DS, SEPTRA DS) 160-800 mg per tablet Take 1 Tablet by mouth two (2) times a day. 20 Tablet 0     Past Medical History:   Diagnosis Date    Asthma     in childhood    Encounter to establish care 11/30/2022    Hyperglycemia 11/21/2022    Verruca plantaris 10/17/2022    Micky Brown DPM     History reviewed. No pertinent surgical history.   No Known Allergies    REVIEW OF SYSTEMS:  General: negative for - chills or fever  ENT: negative for - headaches, nasal congestion or tinnitus  Respiratory: negative for - cough, hemoptysis, shortness of breath or wheezing  Cardiovascular : negative for - chest pain, edema, palpitations or shortness of breath  Gastrointestinal: negative for - abdominal pain, blood in stools, heartburn or nausea/vomiting  Genito-Urinary: no dysuria, trouble voiding, or hematuria  Musculoskeletal: negative for - gait disturbance, joint pain, joint stiffness or joint swelling  Neurological: no TIA or stroke symptoms  Hematologic: no bruises, no bleeding, no swollen glands  Integument: no lumps, mole changes, nail changes or rash  Endocrine:no malaise/lethargy or unexpected weight changes      Social History     Socioeconomic History    Marital status: SINGLE   Tobacco Use    Smoking status: Some Days     Types: Cigars    Smokeless tobacco: Never    Tobacco comments:     Black and mild, twice a month   Vaping Use    Vaping Use: Never used   Substance and Sexual Activity    Alcohol use: Not Currently    Drug use: Yes     Types: Marijuana    Sexual activity: Yes     Partners: Female     Birth control/protection: None     Family History   Problem Relation Age of Onset    Hypertension Mother     Diabetes Mother     Hypertension Father     Diabetes Father     Heart Attack Father     Kidney Disease Father    Habits:  He is a lifetime nondrug abuser, nonsmoker, nondrinker, but he smokes occasional marijuana. Social History:  The patient is single. Lives with his mom. Completed high school. He has a 3year-old daughter. Does [employer]. No Restorationist preference. Family History:  Father  at 46 of myocardial infarction. Mother is 48 with diabetes and hypertension. Two sisters and one brother alive and well.         OBJECTIVE:     Visit Vitals  /88 (BP 1 Location: Right arm, BP Patient Position: Sitting, BP Cuff Size: Large adult)   Pulse 78   Temp 98.1 °F (36.7 °C) (Oral)   Resp 16   Ht 5' 11\" (1.803 m)   Wt 250 lb 4.8 oz (113.5 kg)   SpO2 96%   BMI 34.91 kg/m²     CONSTITUTIONAL: well , well nourished, appears age appropriate  EYES: perrla, eom intact  ENMT:moist mucous membranes, pharynx clear  NECK: supple. Thyroid normal  RESPIRATORY: Chest: clear bilaterally  CARDIOVASCULAR: Heart: regular rate and rhythm  GASTROINTESTINAL: Abdomen: soft, bowel sounds active  HEMATOLOGIC: no pathological lymph nodes palpated  MUSCULOSKELETAL: Extremities: no edema, pulse 1+   INTEGUMENT: No unusual rashes or suspicious skin lesions noted. Nails appear normal.  NEUROLOGIC: non-focal exam   MENTAL STATUS: alert and oriented, appropriate affect     Admission on 11/21/2022, Discharged on 11/21/2022   Component Date Value Ref Range Status    Color 11/21/2022 DARK YELLOW    Final    Color Reference Range: Straw, Yellow or Dark Yellow    Appearance 11/21/2022 CLEAR  CLEAR   Final    Specific gravity 11/21/2022 1.030  1.003 - 1.030   Final    pH (UA) 11/21/2022 7.0  5.0 - 8.0   Final    Protein 11/21/2022 30 (A)  NEG mg/dL Final    Glucose 11/21/2022 >1,000 (A)  NEG mg/dL Final    Ketone 11/21/2022 15 (A)  NEG mg/dL Final    Bilirubin 11/21/2022 Negative  NEG   Final    Blood 11/21/2022 Negative  NEG   Final    Urobilinogen 11/21/2022 1.0  0.2 - 1.0 EU/dL Final    Nitrites 11/21/2022 Negative  NEG   Final    Leukocyte Esterase 11/21/2022 SMALL (A)  NEG   Final    WBC 11/21/2022 5-10  0 - 4 /hpf Final    RBC 11/21/2022 0-5  0 - 5 /hpf Final    Epithelial cells 11/21/2022 FEW  FEW /lpf Final    Epithelial cell category consists of squamous cells and /or transitional urothelial cells. Renal tubular cells, if present, are separately identified as such.     Bacteria 11/21/2022 Negative  NEG /hpf Final    UA:UC IF INDICATED 11/21/2022 CULTURE NOT INDICATED BY UA RESULT  CNI   Final    Sample type 11/21/2022 URINE    Final    Source 11/21/2022 URINE    Final    Chlamydia amplified,urine 11/21/2022 Negative  NEG   Final    N. gonorrhoeae amplified,urine 11/21/2022 Negative  NEG   Final    Comment 11/21/2022        Final    Comment: Testing performed by the Roche Remi CT/NG method, utilizing PCR amplification to identify DNA of the pathogens. This method is not recommended as the sole method of evaluation of cases of sexual abuse nor for other medico-legal indications. Cultures are recommended in these cases. As with all laboratory tests, these results should be interpreted in conjunction with the patient's clinical presentation. WBC 11/21/2022 15.5 (A)  4.1 - 11.1 K/uL Final    RBC 11/21/2022 5.84 (A)  4.10 - 5.70 M/uL Final    HGB 11/21/2022 15.6  12.1 - 17.0 g/dL Final    HCT 11/21/2022 46.4  36.6 - 50.3 % Final    MCV 11/21/2022 79.5 (A)  80.0 - 99.0 FL Final    MCH 11/21/2022 26.7  26.0 - 34.0 PG Final    MCHC 11/21/2022 33.6  30.0 - 36.5 g/dL Final    RDW 11/21/2022 12.3  11.5 - 14.5 % Final    PLATELET 71/94/5748 723  150 - 400 K/uL Final    MPV 11/21/2022 10.1  8.9 - 12.9 FL Final    NRBC 11/21/2022 0.0  0  WBC Final    ABSOLUTE NRBC 11/21/2022 0.00  0.00 - 0.01 K/uL Final    NEUTROPHILS 11/21/2022 68  32 - 75 % Final    LYMPHOCYTES 11/21/2022 23  12 - 49 % Final    MONOCYTES 11/21/2022 7  5 - 13 % Final    EOSINOPHILS 11/21/2022 1  0 - 7 % Final    BASOPHILS 11/21/2022 0  0 - 1 % Final    IMMATURE GRANULOCYTES 11/21/2022 1 (A)  0.0 - 0.5 % Final    ABS. NEUTROPHILS 11/21/2022 10.6 (A)  1.8 - 8.0 K/UL Final    ABS. LYMPHOCYTES 11/21/2022 3.6 (A)  0.8 - 3.5 K/UL Final    ABS. MONOCYTES 11/21/2022 1.1 (A)  0.0 - 1.0 K/UL Final    ABS. EOSINOPHILS 11/21/2022 0.1  0.0 - 0.4 K/UL Final    ABS. BASOPHILS 11/21/2022 0.0  0.0 - 0.1 K/UL Final    ABS. IMM.  GRANS. 11/21/2022 0.1 (A)  0.00 - 0.04 K/UL Final    DF 11/21/2022 AUTOMATED    Final    Sodium 11/21/2022 137  136 - 145 mmol/L Final    Potassium 11/21/2022 3.6  3.5 - 5.1 mmol/L Final    Chloride 11/21/2022 98  97 - 108 mmol/L Final    CO2 11/21/2022 29  21 - 32 mmol/L Final    Anion gap 11/21/2022 10  5 - 15 mmol/L Final    Glucose 11/21/2022 250 (A)  65 - 100 mg/dL Final    BUN 11/21/2022 6  6 - 20 MG/DL Final    Creatinine 11/21/2022 0.99  0.70 - 1.30 MG/DL Final    BUN/Creatinine ratio 11/21/2022 6 (A)  12 - 20   Final    eGFR 11/21/2022 >60  >60 ml/min/1.73m2 Final    Comment:      Pediatric calculator link: John[employer].at. org/professionals/kdoqi/gfr_calculatorped       Effective Oct 3, 2022       These results are not intended for use in patients <25years of age. eGFR results are calculated without a race factor using  the 2021 CKD-EPI equation. Careful clinical correlation is recommended, particularly when comparing to results calculated using previous equations. The CKD-EPI equation is less accurate in patients with extremes of muscle mass, extra-renal metabolism of creatinine, excessive creatine ingestion, or following therapy that affects renal tubular secretion. Calcium 11/21/2022 9.2  8.5 - 10.1 MG/DL Final    Bilirubin, total 11/21/2022 1.1 (A)  0.2 - 1.0 MG/DL Final    ALT (SGPT) 11/21/2022 50  12 - 78 U/L Final    AST (SGOT) 11/21/2022 17  15 - 37 U/L Final    Alk. phosphatase 11/21/2022 66  45 - 117 U/L Final    Protein, total 11/21/2022 8.5 (A)  6.4 - 8.2 g/dL Final    Albumin 11/21/2022 4.0  3.5 - 5.0 g/dL Final    Globulin 11/21/2022 4.5 (A)  2.0 - 4.0 g/dL Final    A-G Ratio 11/21/2022 0.9 (A)  1.1 - 2.2   Final    Mumps Ab, IgM 11/21/2022 <0.80  0.00 - 0.79 AU Final    Comment: (NOTE)                                Negative         < 0.80                                Borderline  0.80 - 1.20                                Positive         > 1.20  Note: The presence of IgM specific antibody should be  interpreted in conjunction with the patient's clinical  history and exposure risk when an acute infection is  suspected.   Performed At: [facility] & [address] [id number]  Neil Mohs MD PO:[phone] ASSESSMENT:   1. Encounter to establish care    2. Mild intermittent asthma, unspecified whether complicated    3. Hyperglycemia      The patient's medical status is stable. He is concerned about his weight. We encouraged heart healthy weight-reducing diet and physical activity 30 minutes five days a week. No evidence of bronchospasm on exam.  He had a hyperglycemic episode, that is his blood sugar was 250 fasting. We will check hemoglobin A1c as well as a random blood sugar today. If his sugar would be elevated, we will place him on metformin. He agrees with the plan, also agrees to see a dietitian if that should be necessary. If all of this is negative, however, he will be back to see us in a year. Otherwise, he will see us in three months. We encouraged him to communicate with us with [place]. I have discussed the diagnosis with the patient and the intended plan as seen in the  orders above. The patient understands and agees with the plan. The patient has   received an after visit summary and questions were answered concerning  future plans  Patient labs and/or xrays were reviewed  Past records were reviewed. PLAN:  .  Orders Placed This Encounter    HEPATITIS C AB    CBC WITH AUTOMATED DIFF    METABOLIC PANEL, COMPREHENSIVE    HEMOGLOBIN A1C WITH EAG       Follow-up and Dispositions    Return in about 1 year (around 11/30/2023). ATTENTION:   This medical record was transcribed using an electronic medical records system. Although proofread, it may and can contain electronic and spelling errors. Other human spelling and other errors may be present. Corrections may be executed at a later time. Please feel free to contact us for any clarifications as needed.

## 2022-11-30 NOTE — PROGRESS NOTES
Identified pt with two pt identifiers(name and ). Reviewed record in preparation for visit and have obtained necessary documentation.   Chief Complaint   Patient presents with    Establish Care    ED Follow-up     [facility] 2022 for LOC with injury        Health Maintenance Due   Topic    Hepatitis C Screening     Depression Screen     COVID-19 Vaccine (1)    Pneumococcal 0-64 years (1 - PCV)    HPV Age 9Y-34Y (1 - Male 2-dose series)    Flu Vaccine (1)      Visit Vitals  /88 (BP 1 Location: Right arm, BP Patient Position: Sitting, BP Cuff Size: Large adult)   Pulse 78   Temp 98.1 °F (36.7 °C) (Oral)   Resp 16   Ht 5' 11\" (1.803 m)   Wt 250 lb 4.8 oz (113.5 kg)   SpO2 96%   BMI 34.91 kg/m²     Pain Scale: 0 - No pain/10

## 2022-12-01 PROBLEM — E11.9 DM2 (DIABETES MELLITUS, TYPE 2) (HCC): Status: ACTIVE | Noted: 2022-11-30

## 2022-12-01 LAB
ALBUMIN SERPL-MCNC: 4 G/DL (ref 3.5–5)
ALBUMIN/GLOB SERPL: 1 {RATIO} (ref 1.1–2.2)
ALP SERPL-CCNC: 70 U/L (ref 45–117)
ALT SERPL-CCNC: 31 U/L (ref 12–78)
ANION GAP SERPL CALC-SCNC: 6 MMOL/L (ref 5–15)
AST SERPL-CCNC: 8 U/L (ref 15–37)
BASOPHILS # BLD: 0 K/UL (ref 0–0.1)
BASOPHILS NFR BLD: 0 % (ref 0–1)
BILIRUB SERPL-MCNC: 0.5 MG/DL (ref 0.2–1)
BUN SERPL-MCNC: 10 MG/DL (ref 6–20)
BUN/CREAT SERPL: 11 (ref 12–20)
CALCIUM SERPL-MCNC: 10.1 MG/DL (ref 8.5–10.1)
CHLORIDE SERPL-SCNC: 102 MMOL/L (ref 97–108)
CO2 SERPL-SCNC: 31 MMOL/L (ref 21–32)
CREAT SERPL-MCNC: 0.87 MG/DL (ref 0.7–1.3)
DIFFERENTIAL METHOD BLD: ABNORMAL
EOSINOPHIL # BLD: 0.2 K/UL (ref 0–0.4)
EOSINOPHIL NFR BLD: 2 % (ref 0–7)
ERYTHROCYTE [DISTWIDTH] IN BLOOD BY AUTOMATED COUNT: 12.2 % (ref 11.5–14.5)
EST. AVERAGE GLUCOSE BLD GHB EST-MCNC: 266 MG/DL
GLOBULIN SER CALC-MCNC: 3.9 G/DL (ref 2–4)
GLUCOSE SERPL-MCNC: 238 MG/DL (ref 65–100)
HBA1C MFR BLD: 10.9 % (ref 4–5.6)
HCT VFR BLD AUTO: 53.4 % (ref 36.6–50.3)
HCV AB SERPL QL IA: NONREACTIVE
HGB BLD-MCNC: 17 G/DL (ref 12.1–17)
IMM GRANULOCYTES # BLD AUTO: 0 K/UL (ref 0–0.04)
IMM GRANULOCYTES NFR BLD AUTO: 0 % (ref 0–0.5)
LYMPHOCYTES # BLD: 3.8 K/UL (ref 0.8–3.5)
LYMPHOCYTES NFR BLD: 40 % (ref 12–49)
MCH RBC QN AUTO: 26 PG (ref 26–34)
MCHC RBC AUTO-ENTMCNC: 31.8 G/DL (ref 30–36.5)
MCV RBC AUTO: 81.5 FL (ref 80–99)
MONOCYTES # BLD: 0.4 K/UL (ref 0–1)
MONOCYTES NFR BLD: 5 % (ref 5–13)
NEUTS SEG # BLD: 5.1 K/UL (ref 1.8–8)
NEUTS SEG NFR BLD: 53 % (ref 32–75)
NRBC # BLD: 0 K/UL (ref 0–0.01)
NRBC BLD-RTO: 0 PER 100 WBC
PLATELET # BLD AUTO: 403 K/UL (ref 150–400)
PMV BLD AUTO: 10 FL (ref 8.9–12.9)
POTASSIUM SERPL-SCNC: 4 MMOL/L (ref 3.5–5.1)
PROT SERPL-MCNC: 7.9 G/DL (ref 6.4–8.2)
RBC # BLD AUTO: 6.55 M/UL (ref 4.1–5.7)
SODIUM SERPL-SCNC: 139 MMOL/L (ref 136–145)
WBC # BLD AUTO: 9.6 K/UL (ref 4.1–11.1)

## 2022-12-01 RX ORDER — METFORMIN HYDROCHLORIDE 500 MG/1
500 TABLET, EXTENDED RELEASE ORAL
Qty: 30 TABLET | Refills: 11 | Status: SHIPPED | OUTPATIENT
Start: 2022-12-01

## 2022-12-02 NOTE — PROGRESS NOTES
Laboratory studies are stable with the exception of the hemoglobin A1c. Your laboratory studies indicates you have developed Diabetes Mellitus Type 2. We recommend you maintain your ideal body weight, exercising for 30 minutes 5 times per week and avoid sugar, white bread and white potatoes. Please call your insurance company and ask them what home glucose monitoring or test strips that they will pay for. After obtaining that information please call my office nurse and she will send that prescription to your pharmacist. If you prefer you can send me a MyChart note with that information If you desire we will arrange for you to go to a Diabetes teaching center for instruction and education. If you prefer, you can go to the American Diabetic Association website to obtain further information.    I sent metformin medication to bring his sugar down to your pharmacist.    I suggest you come by the office within the next month for further evaluation

## 2023-12-05 ENCOUNTER — OFFICE VISIT (OUTPATIENT)
Facility: CLINIC | Age: 23
End: 2023-12-05
Payer: COMMERCIAL

## 2023-12-05 VITALS
HEIGHT: 72 IN | BODY MASS INDEX: 34.47 KG/M2 | WEIGHT: 254.5 LBS | OXYGEN SATURATION: 100 % | TEMPERATURE: 98.8 F | DIASTOLIC BLOOD PRESSURE: 84 MMHG | HEART RATE: 90 BPM | RESPIRATION RATE: 18 BRPM | SYSTOLIC BLOOD PRESSURE: 132 MMHG

## 2023-12-05 DIAGNOSIS — J45.20 MILD INTERMITTENT ASTHMA, UNSPECIFIED WHETHER COMPLICATED: ICD-10-CM

## 2023-12-05 DIAGNOSIS — Z76.89 ENCOUNTER TO ESTABLISH CARE: Primary | ICD-10-CM

## 2023-12-05 DIAGNOSIS — E78.5 DYSLIPIDEMIA: ICD-10-CM

## 2023-12-05 DIAGNOSIS — E11.65 TYPE 2 DIABETES MELLITUS WITH HYPERGLYCEMIA, WITHOUT LONG-TERM CURRENT USE OF INSULIN (HCC): ICD-10-CM

## 2023-12-05 PROCEDURE — 99215 OFFICE O/P EST HI 40 MIN: CPT | Performed by: INTERNAL MEDICINE

## 2023-12-05 RX ORDER — GLIMEPIRIDE 1 MG/1
1 TABLET ORAL EVERY MORNING
Qty: 30 TABLET | Refills: 3 | Status: SHIPPED | OUTPATIENT
Start: 2023-12-05

## 2023-12-05 RX ORDER — METFORMIN HYDROCHLORIDE 500 MG/1
500 TABLET, EXTENDED RELEASE ORAL
Qty: 30 TABLET | Refills: 5 | Status: SHIPPED | OUTPATIENT
Start: 2023-12-05

## 2023-12-05 ASSESSMENT — PATIENT HEALTH QUESTIONNAIRE - PHQ9
SUM OF ALL RESPONSES TO PHQ QUESTIONS 1-9: 0
SUM OF ALL RESPONSES TO PHQ9 QUESTIONS 1 & 2: 0
SUM OF ALL RESPONSES TO PHQ QUESTIONS 1-9: 0
2. FEELING DOWN, DEPRESSED OR HOPELESS: 0
1. LITTLE INTEREST OR PLEASURE IN DOING THINGS: 0

## 2023-12-06 LAB
ALBUMIN SERPL-MCNC: 4 G/DL (ref 3.5–5)
ALBUMIN/GLOB SERPL: 1.1 (ref 1.1–2.2)
ALP SERPL-CCNC: 62 U/L (ref 45–117)
ALT SERPL-CCNC: 22 U/L (ref 12–78)
ANION GAP SERPL CALC-SCNC: 8 MMOL/L (ref 5–15)
APPEARANCE UR: CLEAR
AST SERPL-CCNC: 11 U/L (ref 15–37)
BACTERIA URNS QL MICRO: NEGATIVE /HPF
BASOPHILS # BLD: 0 K/UL (ref 0–0.1)
BASOPHILS NFR BLD: 0 % (ref 0–1)
BILIRUB SERPL-MCNC: 0.5 MG/DL (ref 0.2–1)
BILIRUB UR QL: NEGATIVE
BUN SERPL-MCNC: 11 MG/DL (ref 6–20)
BUN/CREAT SERPL: 11 (ref 12–20)
CALCIUM SERPL-MCNC: 8.9 MG/DL (ref 8.5–10.1)
CHLORIDE SERPL-SCNC: 103 MMOL/L (ref 97–108)
CHOLEST SERPL-MCNC: 265 MG/DL
CO2 SERPL-SCNC: 27 MMOL/L (ref 21–32)
COLOR UR: ABNORMAL
CREAT SERPL-MCNC: 1.04 MG/DL (ref 0.7–1.3)
CREAT UR-MCNC: 123 MG/DL
DIFFERENTIAL METHOD BLD: ABNORMAL
EOSINOPHIL # BLD: 0.2 K/UL (ref 0–0.4)
EOSINOPHIL NFR BLD: 3 % (ref 0–7)
EPITH CASTS URNS QL MICRO: ABNORMAL /LPF
ERYTHROCYTE [DISTWIDTH] IN BLOOD BY AUTOMATED COUNT: 13.1 % (ref 11.5–14.5)
EST. AVERAGE GLUCOSE BLD GHB EST-MCNC: 249 MG/DL
GLOBULIN SER CALC-MCNC: 3.6 G/DL (ref 2–4)
GLUCOSE SERPL-MCNC: 368 MG/DL (ref 65–100)
GLUCOSE UR STRIP.AUTO-MCNC: >1000 MG/DL
HBA1C MFR BLD: 10.3 % (ref 4–5.6)
HBV SURFACE AB SER QL: REACTIVE
HBV SURFACE AB SER-ACNC: 12.27 MIU/ML
HBV SURFACE AG SER QL: 0.24 INDEX
HBV SURFACE AG SER QL: NEGATIVE
HCT VFR BLD AUTO: 47.7 % (ref 36.6–50.3)
HCV AB SER IA-ACNC: 0.06 INDEX
HCV AB SERPL QL IA: NONREACTIVE
HDLC SERPL-MCNC: 51 MG/DL
HDLC SERPL: 5.2 (ref 0–5)
HGB BLD-MCNC: 15.6 G/DL (ref 12.1–17)
HGB UR QL STRIP: NEGATIVE
HIV 1+2 AB+HIV1 P24 AG SERPL QL IA: NONREACTIVE
HIV 1/2 RESULT COMMENT: NORMAL
HYALINE CASTS URNS QL MICRO: ABNORMAL /LPF (ref 0–5)
IMM GRANULOCYTES # BLD AUTO: 0 K/UL (ref 0–0.04)
IMM GRANULOCYTES NFR BLD AUTO: 0 % (ref 0–0.5)
KETONES UR QL STRIP.AUTO: NEGATIVE MG/DL
LDLC SERPL CALC-MCNC: 153 MG/DL (ref 0–100)
LEUKOCYTE ESTERASE UR QL STRIP.AUTO: NEGATIVE
LYMPHOCYTES # BLD: 3.3 K/UL (ref 0.8–3.5)
LYMPHOCYTES NFR BLD: 47 % (ref 12–49)
MCH RBC QN AUTO: 26.3 PG (ref 26–34)
MCHC RBC AUTO-ENTMCNC: 32.7 G/DL (ref 30–36.5)
MCV RBC AUTO: 80.3 FL (ref 80–99)
MICROALBUMIN UR-MCNC: <0.5 MG/DL
MICROALBUMIN/CREAT UR-RTO: <4 MG/G (ref 0–30)
MONOCYTES # BLD: 0.4 K/UL (ref 0–1)
MONOCYTES NFR BLD: 5 % (ref 5–13)
NEUTS SEG # BLD: 3.2 K/UL (ref 1.8–8)
NEUTS SEG NFR BLD: 45 % (ref 32–75)
NITRITE UR QL STRIP.AUTO: NEGATIVE
NRBC # BLD: 0 K/UL (ref 0–0.01)
NRBC BLD-RTO: 0 PER 100 WBC
PH UR STRIP: 6 (ref 5–8)
PLATELET # BLD AUTO: 281 K/UL (ref 150–400)
PMV BLD AUTO: 10.4 FL (ref 8.9–12.9)
POTASSIUM SERPL-SCNC: 4 MMOL/L (ref 3.5–5.1)
PROT SERPL-MCNC: 7.6 G/DL (ref 6.4–8.2)
PROT UR STRIP-MCNC: NEGATIVE MG/DL
RBC # BLD AUTO: 5.94 M/UL (ref 4.1–5.7)
RBC #/AREA URNS HPF: ABNORMAL /HPF (ref 0–5)
SODIUM SERPL-SCNC: 138 MMOL/L (ref 136–145)
SP GR UR REFRACTOMETRY: 1.01 (ref 1–1.03)
TRIGL SERPL-MCNC: 305 MG/DL
UROBILINOGEN UR QL STRIP.AUTO: 0.2 EU/DL (ref 0.2–1)
VLDLC SERPL CALC-MCNC: 61 MG/DL
WBC # BLD AUTO: 7.1 K/UL (ref 4.1–11.1)
WBC URNS QL MICRO: ABNORMAL /HPF (ref 0–4)

## 2023-12-06 RX ORDER — ATORVASTATIN CALCIUM 20 MG/1
20 TABLET, FILM COATED ORAL DAILY
Qty: 90 TABLET | Refills: 3 | Status: SHIPPED | OUTPATIENT
Start: 2023-12-06

## 2023-12-07 ENCOUNTER — TELEPHONE (OUTPATIENT)
Facility: CLINIC | Age: 23
End: 2023-12-07

## 2023-12-07 LAB
GAD65 AB SER-ACNC: <5 U/ML (ref 0–5)
HBV CORE AB SERPL QL IA: NEGATIVE

## 2023-12-07 NOTE — TELEPHONE ENCOUNTER
----- Message from Mitch Garcia MD sent at 12/6/2023 10:18 PM EST -----  Patient is newly diagnosed diabetes. Encouraged him to take his medications. He was to call his insurance company to determine the glucose monitor need to send with his insurance,.   Encouraged him to keep his next appointment

## 2023-12-07 NOTE — TELEPHONE ENCOUNTER
Patient left message to please take medss and call with name of glucometer and to schedule follow up appt

## 2023-12-08 LAB — C PEPTIDE SERPL-MCNC: 4.2 NG/ML (ref 1.1–4.4)

## 2023-12-11 LAB — ZNT8 AB: <15 U/ML

## 2025-03-29 ENCOUNTER — HOSPITAL ENCOUNTER (EMERGENCY)
Facility: HOSPITAL | Age: 25
Discharge: HOME | End: 2025-03-29
Payer: COMMERCIAL

## 2025-03-29 VITALS
HEIGHT: 70 IN | HEART RATE: 84 BPM | TEMPERATURE: 98.4 F | SYSTOLIC BLOOD PRESSURE: 149 MMHG | BODY MASS INDEX: 32.93 KG/M2 | WEIGHT: 230 LBS | DIASTOLIC BLOOD PRESSURE: 69 MMHG | RESPIRATION RATE: 20 BRPM | OXYGEN SATURATION: 96 %
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
## 2025-03-29 PROCEDURE — 6370000000 HC RX 637 (ALT 250 FOR IP)

## 2025-03-29 PROCEDURE — 99283 EMERGENCY DEPT VISIT LOW MDM: CPT

## 2025-03-29 RX ORDER — IBUPROFEN 600 MG/1
600 TABLET, FILM COATED ORAL 3 TIMES DAILY PRN
Qty: 30 TABLET | Refills: 0 | Status: SHIPPED | OUTPATIENT
Start: 2025-03-29

## 2025-03-29 RX ORDER — METHOCARBAMOL 500 MG/1
1000 TABLET, FILM COATED ORAL 4 TIMES DAILY
Status: DISCONTINUED | OUTPATIENT
Start: 2025-03-29 | End: 2025-03-29

## 2025-03-29 RX ORDER — IBUPROFEN 600 MG/1
600 TABLET, FILM COATED ORAL
Status: COMPLETED | OUTPATIENT
Start: 2025-03-29 | End: 2025-03-29

## 2025-03-29 RX ORDER — METHOCARBAMOL 750 MG/1
750 TABLET, FILM COATED ORAL 4 TIMES DAILY
Qty: 40 TABLET | Refills: 0 | Status: SHIPPED | OUTPATIENT
Start: 2025-03-29 | End: 2025-04-08

## 2025-03-29 RX ORDER — METHOCARBAMOL 500 MG/1
1000 TABLET, FILM COATED ORAL 4 TIMES DAILY
Status: DISCONTINUED | OUTPATIENT
Start: 2025-03-29 | End: 2025-03-29 | Stop reason: HOSPADM

## 2025-03-29 RX ADMIN — IBUPROFEN 600 MG: 600 TABLET, FILM COATED ORAL at 03:14

## 2025-03-29 RX ADMIN — METHOCARBAMOL 1000 MG: 500 TABLET ORAL at 03:14

## 2025-03-29 ASSESSMENT — PAIN DESCRIPTION - ORIENTATION: ORIENTATION: LEFT

## 2025-03-29 ASSESSMENT — PAIN - FUNCTIONAL ASSESSMENT: PAIN_FUNCTIONAL_ASSESSMENT: ACTIVITIES ARE NOT PREVENTED

## 2025-03-29 ASSESSMENT — PAIN DESCRIPTION - DESCRIPTORS: DESCRIPTORS: ACHING

## 2025-03-29 ASSESSMENT — PAIN DESCRIPTION - FREQUENCY: FREQUENCY: CONTINUOUS

## 2025-03-29 ASSESSMENT — PAIN DESCRIPTION - PAIN TYPE: TYPE: ACUTE PAIN

## 2025-03-29 ASSESSMENT — PAIN SCALES - GENERAL: PAINLEVEL_OUTOF10: 9

## 2025-03-29 ASSESSMENT — PAIN DESCRIPTION - LOCATION: LOCATION: OTHER (COMMENT)

## 2025-03-29 ASSESSMENT — PAIN DESCRIPTION - ONSET: ONSET: ON-GOING

## 2025-03-29 NOTE — DISCHARGE INSTRUCTIONS
Thank You!    It was a pleasure taking care of you in our Emergency Department today. We know that when you come to our Emergency Department, you are entrusting us with your health, comfort, and safety. Our physicians and nurses honor that trust, and truly appreciate the opportunity to care for you and your loved ones.      We also value your feedback. If you receive a survey about your Emergency Department experience today, please fill it out.  We care about our patients' feedback, and we listen to what you have to say.  Thank you.    Bryce Diaz MD  ________________________________________________________________________  I have included a copy of your lab results and/or radiologic studies from today's visit so you can have them easily available at your follow-up visit. We hope you feel better and please do not hesitate to contact the ED if you have any questions at all!    No results found for this or any previous visit (from the past 12 hours).  No orders to display       The exam and treatment you received in the Emergency Department were for an urgent problem and are not intended as complete care. It is important that you follow up with a doctor, nurse practitioner, or physician assistant for ongoing care. If your symptoms become worse or you do not improve as expected and you are unable to reach your usual health care provider, you should return to the Emergency Department. We are available 24 hours a day.    Please take your discharge instructions with you when you go to your follow-up appointment.     If a prescription has been provided, please have it filled as soon as possible to prevent a delay in treatment. Read the entire medication instruction sheet provided to you by the pharmacy. If you have any questions or reservations about taking the medication due to side effects or interactions with other medications, please call your primary care physician or contact the ER to speak with the charge

## 2025-03-29 NOTE — ED NOTES
Discharge instructions were given to the patient by Gilda WOODALL.     The patient left the Emergency Department alert and oriented and in no acute distress with 2 prescriptions. The patient was encouraged to call or return to the ED for worsening issues or problems and was encouraged to schedule a follow up appointment for continuing care.     Ambulation assessment completed before discharge.  Pt left Emergency Department ambulating at baseline with no ortho devices  Ortho device education: none    The patient verbalized understanding of discharge instructions and prescriptions, all questions were answered. The patient has no further concerns at this time.
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
## 2025-03-29 NOTE — ED NOTES
See triage note. Pt is alert and oriented x 4, RR even and unlabored, skin is warm and dry. Assesment completed and pt updated on plan of care.       Emergency Department Nursing Plan of Care       The Nursing Plan of Care is developed from the Nursing assessment and Emergency Department Attending provider initial evaluation.  The plan of care may be reviewed in the “ED Provider note”.    The Plan of Care was developed with the following considerations:   Patient / Family readiness to learn indicated by:verbalized understanding  Persons(s) to be included in education: patient  Barriers to Learning/Limitations:None    Signed     Gilda Dixon RN    3/29/2025   2:52 AM

## (undated) DEVICE — PAD,ABDOMINAL,5"X9",ST,LF,25/BX: Brand: MEDLINE INDUSTRIES, INC.

## (undated) DEVICE — COVER LT HNDL PLAS RIG 1 PER PK

## (undated) DEVICE — SPONGE GZ W4XL4IN COT 12 PLY TYP VII WVN C FLD DSGN

## (undated) DEVICE — TOWEL,OR,DSP,ST,BLUE,STD,2/PK,40PK/CS: Brand: MEDLINE

## (undated) DEVICE — SYRINGE,EAR/ULCER, 2 OZ, STERILE: Brand: MEDLINE

## (undated) DEVICE — PREP SKN CHLRAPRP APL 26ML STR --

## (undated) DEVICE — HYPODERMIC SAFETY NEEDLE: Brand: MONOJECT

## (undated) DEVICE — BANDAGE,GAUZE,BULKEE II,4.5"X4.1YD,STRL: Brand: MEDLINE

## (undated) DEVICE — GLOVE SURG SZ 65 THK91MIL LTX FREE SYN POLYISOPRENE

## (undated) DEVICE — BANDAGE,GAUZE,CONFORMING,3"X75",STRL,LF: Brand: MEDLINE

## (undated) DEVICE — DRAPE,REIN 53X77,STERILE: Brand: MEDLINE

## (undated) DEVICE — SYR 10ML LUER LOK 1/5ML GRAD --

## (undated) DEVICE — SPONGE GZ W4XL4IN COT RADPQ HIGHLY ABSRB